# Patient Record
Sex: FEMALE | Race: WHITE | Employment: STUDENT | URBAN - METROPOLITAN AREA
[De-identification: names, ages, dates, MRNs, and addresses within clinical notes are randomized per-mention and may not be internally consistent; named-entity substitution may affect disease eponyms.]

---

## 2017-02-28 ENCOUNTER — OFFICE VISIT (OUTPATIENT)
Dept: PSYCHIATRY | Facility: CLINIC | Age: 22
End: 2017-02-28
Payer: MEDICAID

## 2017-02-28 VITALS
HEIGHT: 69 IN | DIASTOLIC BLOOD PRESSURE: 58 MMHG | TEMPERATURE: 97.6 F | SYSTOLIC BLOOD PRESSURE: 104 MMHG | HEART RATE: 79 BPM | WEIGHT: 145.1 LBS | BODY MASS INDEX: 21.49 KG/M2 | OXYGEN SATURATION: 98 %

## 2017-02-28 DIAGNOSIS — F41.1 GAD (GENERALIZED ANXIETY DISORDER): ICD-10-CM

## 2017-02-28 PROCEDURE — 99214 OFFICE O/P EST MOD 30 MIN: CPT | Performed by: NURSE PRACTITIONER

## 2017-02-28 RX ORDER — VENLAFAXINE HYDROCHLORIDE 37.5 MG/1
CAPSULE, EXTENDED RELEASE ORAL
Qty: 60 CAPSULE | Refills: 3 | Status: SHIPPED | OUTPATIENT
Start: 2017-02-28 | End: 2017-08-10

## 2017-02-28 ASSESSMENT — ANXIETY QUESTIONNAIRES
3. WORRYING TOO MUCH ABOUT DIFFERENT THINGS: NOT AT ALL
7. FEELING AFRAID AS IF SOMETHING AWFUL MIGHT HAPPEN: NOT AT ALL
GAD7 TOTAL SCORE: 3
5. BEING SO RESTLESS THAT IT IS HARD TO SIT STILL: NOT AT ALL
2. NOT BEING ABLE TO STOP OR CONTROL WORRYING: NOT AT ALL
6. BECOMING EASILY ANNOYED OR IRRITABLE: SEVERAL DAYS
1. FEELING NERVOUS, ANXIOUS, OR ON EDGE: SEVERAL DAYS
IF YOU CHECKED OFF ANY PROBLEMS ON THIS QUESTIONNAIRE, HOW DIFFICULT HAVE THESE PROBLEMS MADE IT FOR YOU TO DO YOUR WORK, TAKE CARE OF THINGS AT HOME, OR GET ALONG WITH OTHER PEOPLE: SOMEWHAT DIFFICULT

## 2017-02-28 ASSESSMENT — PATIENT HEALTH QUESTIONNAIRE - PHQ9: 5. POOR APPETITE OR OVEREATING: SEVERAL DAYS

## 2017-02-28 NOTE — Clinical Note
Hi Dr. Perez, Psychiatry update. I recommend therapy to help more with her symptoms. Will do about one more visit and will likely return care back to you if she continues to do well. Yamilet

## 2017-02-28 NOTE — NURSING NOTE
"Chief Complaint   Patient presents with     RECHECK       Initial /58 (BP Location: Right arm, Patient Position: Chair, Cuff Size: Adult Regular)  Pulse 79  Temp 97.6  F (36.4  C) (Oral)  Ht 5' 8.5\" (1.74 m)  Wt 145 lb 1.6 oz (65.8 kg)  LMP 02/14/2017 (Approximate)  SpO2 98%  BMI 21.74 kg/m2 Estimated body mass index is 21.74 kg/(m^2) as calculated from the following:    Height as of this encounter: 5' 8.5\" (1.74 m).    Weight as of this encounter: 145 lb 1.6 oz (65.8 kg).  Medication Reconciliation: complete   Flavia Mazariegos MA    "

## 2017-02-28 NOTE — PATIENT INSTRUCTIONS
Treatment Plan:    Cotninue Effexor (venlafaxine) XR 37.5 mg by mouth daily for 2 weeks then increase to 75 mg by mouth daily as tolerated. Take one capsule in AM and one capsule in PM.     Continue all other medications as reviewed per electronic medical record today.     All questions addressed. Education and counseling completed regarding risks and benefits of medications and psychotherapy options.    Safety plan was reviewed. To the Emergency Department as needed or call after hours crisis line at [phone] or [phone].     To schedule individual or family therapy, call [facility] at [phone].    Schedule an appointment with me in 3-6 months or sooner as needed. Call [facility] at [phone] to schedule.    Follow up with primary care provider as planned or for acute medical concerns.    Call the psychiatric nurse line with medication questions or concerns at [phone].    My Practice Policy was reviewed and signed: YES     MyChart may be used to communicate with your provider, but this is not intended to be used for emergencies.

## 2017-02-28 NOTE — MR AVS SNAPSHOT
After Visit Summary   2/28/2017    Cathleen Marie    MRN: [id number]           Patient Information     Date Of Birth          1995        Visit Information        Provider Department      2/28/2017 10:15 AM Yamilet Art NP [facility]        Today's Diagnoses     CRISTIANO (generalized anxiety disorder)          Care Instructions      Treatment Plan:    Cotninue Effexor (venlafaxine) XR 37.5 mg by mouth daily for 2 weeks then increase to 75 mg by mouth daily as tolerated. Take one capsule in AM and one capsule in PM.     Continue all other medications as reviewed per electronic medical record today.     All questions addressed. Education and counseling completed regarding risks and benefits of medications and psychotherapy options.    Safety plan was reviewed. To the Emergency Department as needed or call after hours crisis line at [phone] or [phone].     To schedule individual or family therapy, call [facility] at [phone].    Schedule an appointment with me in 3-6 months or sooner as needed. Call [facility] at [phone] to schedule.    Follow up with primary care provider as planned or for acute medical concerns.    Call the psychiatric nurse line with medication questions or concerns at [phone].    My Practice Policy was reviewed and signed: YES     MyChart may be used to communicate with your provider, but this is not intended to be used for emergencies.        Follow-ups after your visit        Follow-up notes from your care team     Return in about 4 months (around 6/28/2017).      Who to contact     If you have questions or need follow up information about today's clinic visit or your schedule please contact [facility] directly at [phone].  Normal or non-critical lab and imaging results will be communicated to you by MyChart, letter or phone within 4 business days after the clinic has received the  "results. If you do not hear from us within 7 days, please contact the clinic through [facility] or phone. If you have a critical or abnormal lab result, we will notify you by phone as soon as possible.  Submit refill requests through [employer] or call your pharmacy and they will forward the refill request to us. Please allow 3 business days for your refill to be completed.          Additional Information About Your Visit        dELiAs Information     [employer] lets you send messages to your doctor, view your test results, renew your prescriptions, schedule appointments and more. To sign up, go to [email or web address] . Click on \"Log in\" on the left side of the screen, which will take you to the Welcome page. Then click on \"Sign up Now\" on the right side of the page.     You will be asked to enter the access code listed below, as well as some personal information. Please follow the directions to create your username and password.     Your access code is: [email or web address]  Expires: 3/7/2017  2:03 PM     Your access code will  in 90 days. If you need help or a new code, please call your [facility] clinic or [phone].        Care EveryWhere ID     This is your Care EveryWhere ID. This could be used by other organizations to access your [facility] medical records  [id number]        Your Vitals Were     Pulse Temperature Height Last Period Pulse Oximetry BMI (Body Mass Index)    79 97.6  F (36.4  C) (Oral) 5' 8.5\" (1.74 m) 2017 (Approximate) 98% 21.74 kg/m2       Blood Pressure from Last 3 Encounters:   17 104/58   16 120/50   16 112/72    Weight from Last 3 Encounters:   17 145 lb 1.6 oz (65.8 kg)   16 142 lb (64.4 kg)   16 139 lb (63 kg)              Today, you had the following     No orders found for display         Where to get your medicines      These medications were sent to [facility] [id number] [place], [place] - [address] [address]  [address] " [address]     Phone:  [phone]     venlafaxine 37.5 MG 24 hr capsule          Primary Care Provider Office Phone # Fax #    Flaca Perez [phone]       [address] [address]        Thank you!     Thank you for choosing [facility]  for your care. Our goal is always to provide you with excellent care. Hearing back from our patients is one way we can continue to improve our services. Please take a few minutes to complete the written survey that you may receive in the mail after your visit with us. Thank you!             Your Updated Medication List - Protect others around you: Learn how to safely use, store and throw away your medicines at www.disposemymeds.org.          This list is accurate as of: 2/28/17 10:40 AM.  Always use your most recent med list.                   Brand Name Dispense Instructions for use    ibuprofen 200 MG tablet    ADVIL/MOTRIN     Take 200 mg by mouth every 4 hours as needed for pain Reported on 2/28/2017       NUVARING 0.12-0.015 MG/24HR vaginal ring   Generic drug:  etonogestrel-ethinyl estradiol     3 each    PLACE 1 RING VAGINALY EVERY 28 DAYS       venlafaxine 37.5 MG 24 hr capsule    EFFEXOR-XR    60 capsule    Start one capsule by mouth daily for 2 weeks then increase to two capsules daily.

## 2017-02-28 NOTE — PROGRESS NOTES
"    Outpatient Psychiatric Progress Note    Name: Cathleen Marie   : 1995                    Primary Care Provider: Flaca Perez MD - last visit 3/7/2016  Therapist: None    PHQ-9 scores:  PHQ-9 SCORE 3/7/2016 2016 2017   Total Score 9 15 6       CIRSTIANO-7 scores:  CRISTIANO-7 SCORE 2014   Total Score 7 12 3       Patient Identification:  Patient is a 22 year old year old, single  White American female  who presents for return visit with me.  Patient is currently employed part time. Patient attended the session alone. Patient prefers to be called: \"Cathleen\"    Interim History:    I last saw Cathleen Marie for outpatient psychiatry Consultation on 2016.     During that appointment, we Discontinued Prozac (fluoxetine).     Restart melatonin at bedtime for insomnia.     Consider addition of Vitamin D3 1000 - 2000 units per day.     Start Effexor (venlafaxine) XR 37.5 mg by mouth daily for 2 weeks then increase to 75 mg by mouth daily.      Current medications include:   Current Outpatient Prescriptions   Medication Sig     venlafaxine (EFFEXOR-XR) 37.5 MG 24 hr capsule Start one capsule by mouth daily for 2 weeks then increase to two capsules daily.     NUVARING 0.12-0.015 MG/24HR vaginal ring PLACE 1 RING VAGINALY EVERY 28 DAYS     ibuprofen (ADVIL,MOTRIN) 200 MG tablet Take 200 mg by mouth every 4 hours as needed for pain Reported on 2017     No current facility-administered medications for this visit.        The [place] Prescription Monitoring Program has been reviewed and there are no concerns about diversionary activity for controlled substances at this time.    Due to lack of insurance, she has only been taking Effexor (venlafaxine) XR 37.5 mg daily in the AM. Has not re-started melatonin for sleep.   Cathleen Marie reports mood has been: \"better\"  Anxiety has been: \"good, less shaking\" no panic  Sleep has been: \"good\" able to fall asleep. No nightmares. " "  PHQ9 and GAD7 scores were reviewed today.   Medication side effects: stomachaches. Almost every day. Usually worse at end of day.   Current stressors include: Symptoms, Occupational difficulties, Phase of Life Difficulties  Coping mechanisms and supports include: Take a bath, Sleeping, Yoga, Marijuana     Past Medical History   Diagnosis Date     Meningitis       has a past medical history of Meningitis.    Social History:  Current Living situation:  [place], [place] with Father. Feels safe at home.  Employment: unemployed - found a part time job as a   Current use of drugs or alcohol: Alcohol once per week- 2 glasses of wine. Cannabis Daily since last year. Reports Marijuana helps with sleep.   Tobacco use: Yes- 1 pp week for 2 years. Yes interest in quitting. NRT tried: [place] but environment makes this tough  Caffeine: Yes 3 cups/day of coffee  Recovery Programming Involvement: None    Vital Signs:   /58 (BP Location: Right arm, Patient Position: Chair, Cuff Size: Adult Regular)  Pulse 79  Temp 97.6  F (36.4  C) (Oral)  Ht 5' 8.5\" (1.74 m)  Wt 145 lb 1.6 oz (65.8 kg)  LMP 02/14/2017 (Approximate)  SpO2 98%  BMI 21.74 kg/m2    Labs:  Most recent laboratory results reviewed and no new labs. Denies she could be pregnant.     Review of Systems:  10 systems (general, cardiovascular, respiratory, eyes, ENT, endocrine, GI, , M/S, neurological) were reviewed. Most pertinent finding(s) is/are: stomachaches, headaches. The remaining systems are all unremarkable.    Mental Status Examination:  Appearance: awake, alert, adequately groomed, appeared stated age, no apparent distress, normal weight and wears makeup  Attitude: cooperative   Eye Contact: good  Gait and Station: Normal, No assistive Devices used and No dizziness or falls  Psychomotor Behavior: no evidence of tardive dyskinesia, dystonia, or tics  Oriented to: time, person, and place  Attention Span and Concentration: adequate  Speech: " "clear, coherent, regular rate, regular rhythm and fluent  Language: Able to read and write  Mood: anxious and depressed, but \"better\"  Affect: mood congruent, calmer, brighter  Associations: no loose associations  Thought Process: logical, linear and goal oriented  Thought Content: no evidence of suicidal ideation or homicidal ideation, no evidence of psychotic thought, no auditory hallucinations present, no visual hallucinations present and Appropriate to Interview  Recent and Remote Memory: intact Not formally assessed. Forgetful. Does not remember childhood. Possibly related to seizure disorder and medications. No amnesia.   Fund of Knowledge: appropriate  Insight: fair  Judgment: intact  Impulse Control: intact    Suicide Risk Assessment:  Today Cathleen Marie reports psychosocial stressors, which have been less lately. In addition, there are notable risk factors for self-harm, including age, anxiety and family history However, risk is mitigated by commitment to family, absence of past attempts, ability to volunteer a safety plan, history of seeking help when needed, future oriented, no access to firearms or weapons, denies suicidal intent or plan and denies homicidal ideation, intent, or plan. Therefore, based on all available evidence including the factors cited above, Cathleen Marie does not appear to be at imminent risk for self-harm, does not meet criteria for a 72-hr hold, and therefore remains appropriate for ongoing outpatient level of care. A thorough assessment of risk factors related to suicide and self-harm have been reviewed and are noted above. The patient convincingly denies suicidality on several occasions. There was no deceit detected, and the patient presented in a manner that was believable.     DSM5 Diagnosis:  296.32 Major Depressive Disorder, Recurrent Episode, Moderate to Severe, without psychosis, With anxious distress  300.23 (F40.10) Social Anxiety Disorder  300.02 (F41.1) Generalized " Anxiety Disorder  Cannabis Use Disorder - Criteria met includes: current daily use;  moderate    Rule out Borderline Personality Disorder (301.83)      Medical comorbidities include:   Patient Active Problem List    Diagnosis Date Noted     HSV (herpes simplex virus) infection 09/18/2014     Priority: Medium     Vaginal and mouth - dx by culture 4/2014  Has valtrex at home for outbreak       Major depressive disorder, recurrent episode, moderate (H) 09/18/2014     Priority: Medium     CRISTIANO (generalized anxiety disorder) 05/04/2012     Priority: Medium     Chronic constipation 03/21/2011     Priority: Medium       Psychosocial & Contextual Factors:  Occupational Difficulties, Financial Difficulties, Relationship Difficulties and Phase of Life Difficulties    Assessment:  Cathleen Marie reports improvement in mood and anxiety and sleep with new medication. Is struggling with nausea, but likely not from medication. Will continue to monitor. Medication side effects and alternatives were reviewed. Consider GeneSight Testing. Reviewed importance of therapy.     Treatment Plan:    Continue Effexor (venlafaxine) XR 37.5 mg by mouth daily for 2 weeks then increase to 75 mg by mouth daily as tolerated. Take one capsule in AM and one capsule in PM.     Continue all other medications as reviewed per electronic medical record today.     All questions addressed. Education and counseling completed regarding risks and benefits of medications and psychotherapy options.    Safety plan was reviewed. To the Emergency Department as needed or call after hours crisis line at [phone] or [phone].     To schedule individual or family therapy, call [facility] at [phone].    Schedule an appointment with me in 3-6 months or sooner as needed. Call [facility] at [phone] to schedule.    Follow up with primary care provider as planned or for acute medical concerns.    Call the psychiatric nurse  line with medication questions or concerns at [phone].    My Practice Policy was reviewed and signed: YES     MyChart may be used to communicate with your provider, but this is not intended to be used for emergencies.    Patient Education:  Marijuana makes you feel great-until it doesn't. Short term positive effects are euphoria and enhanced perception. Not always so positive are: time distortion, hallucinations, anxiety (especially in patients with anxiety disorders), tachycardia, sleepiness, and impaired memory and problem solving.      Marijuana has negative long term effects on chronic users, especially those who started in their teens. These include amotivational syndrome, less success than peers in school and career pursuits, and possibly a reduction in IQ points.     Marijuana and psychosis are clearly associated. Smoking heavily can uncover a psychotic disorder earlier than it would have emerged otherwise. Whether it actually causes psychosis is more controversial.     While high-driving is usually less hazardous than drunk-driving, Marijuana interferes with judgment and perceptions, leading to potential fatalities as well as arrests for DUIs. The highest risk period is during the hour immediately after getting high. Do not smoke and drive.     Withdrawal from regular Marijuana use does occur. A typical Marijuana detox in a heavy user will last 3 to 5 days and may include irritability, insomnia, anxiety, headache, nausea and vomiting, and sometimes diarrhea. Patients should drink plenty of fluids and plan to be out of commission for a while.     We have no medications for treating marijuana use disorder. Dronabinol (Marinol) is a synthetic form of THC that is FDA approved for intractable nausea and AIDS wasting syndrome, but clinical trials have not shown it to be effective for substitution treatment of Marijuana abuse.     Administrative Billing:   Time spent with patient was 30 minutes and greater  than 50% of time or 20 minutes was spent in counseling and coordination of care.    Signed:   Yamilet Art, PhD, APRN, CNP   Psychiatry

## 2017-03-01 ASSESSMENT — ANXIETY QUESTIONNAIRES: GAD7 TOTAL SCORE: 3

## 2017-03-01 ASSESSMENT — PATIENT HEALTH QUESTIONNAIRE - PHQ9: SUM OF ALL RESPONSES TO PHQ QUESTIONS 1-9: 6

## 2017-08-05 ENCOUNTER — HEALTH MAINTENANCE LETTER (OUTPATIENT)
Age: 22
End: 2017-08-05

## 2017-08-10 ENCOUNTER — TELEPHONE (OUTPATIENT)
Dept: PSYCHIATRY | Facility: CLINIC | Age: 22
End: 2017-08-10

## 2017-08-10 DIAGNOSIS — F41.1 GAD (GENERALIZED ANXIETY DISORDER): ICD-10-CM

## 2017-08-10 RX ORDER — VENLAFAXINE HYDROCHLORIDE 37.5 MG/1
37.5 CAPSULE, EXTENDED RELEASE ORAL 2 TIMES DAILY
Qty: 60 CAPSULE | Refills: 0 | Status: SHIPPED | OUTPATIENT
Start: 2017-08-10 | End: 2017-08-14

## 2017-08-10 NOTE — TELEPHONE ENCOUNTER
Spoke to Pt, She initially had not increased her Effexor to BID, however in the past month has done so.  When she attempted to fill the medication, she was told she needed to call our office to get this filled. Reviewed chart and sent new Rx to her pharmacy.     Encouraged her to schedule return visit.     Last OV with Yamilet Art, PhD, APRN, CNP 2/28/17  Assessment:  Cathleen Marie reports improvement in mood and anxiety and sleep with new medication. Is struggling with nausea, but likely not from medication. Will continue to monitor. Medication side effects and alternatives were reviewed. Consider GeneSight Testing. Reviewed importance of therapy.      Treatment Plan:    Continue Effexor (venlafaxine) XR 37.5 mg by mouth daily for 2 weeks then increase to 75 mg by mouth daily as tolerated. Take one capsule in AM and one capsule in PM.     Continue all other medications as reviewed per electronic medical record today.     All questions addressed. Education and counseling completed regarding risks and benefits of medications and psychotherapy options.    Safety plan was reviewed. To the Emergency Department as needed or call after hours crisis line at [phone] or [phone].     To schedule individual or family therapy, call [facility] at [phone].    Schedule an appointment with me in 3-6 months or sooner as needed. Call [facility] at [phone] to schedule.    Follow up with primary care provider as planned or for acute medical concerns.    Call the psychiatric nurse line with medication questions or concerns at [phone].    My Practice Policy was reviewed and signed: YES     MyChart may be used to communicate with your provider, but this is not intended to be used for emergencies.     Patient Education:  Marijuana makes you feel great-until it doesn't. Short term positive effects are euphoria and enhanced perception. Not always so positive are: time  distortion, hallucinations, anxiety (especially in patients with anxiety disorders), tachycardia, sleepiness, and impaired memory and problem solving.       Marijuana has negative long term effects on chronic users, especially those who started in their teens. These include amotivational syndrome, less success than peers in school and career pursuits, and possibly a reduction in IQ points.      Marijuana and psychosis are clearly associated. Smoking heavily can uncover a psychotic disorder earlier than it would have emerged otherwise. Whether it actually causes psychosis is more controversial.      While high-driving is usually less hazardous than drunk-driving, Marijuana interferes with judgment and perceptions, leading to potential fatalities as well as arrests for DUIs. The highest risk period is during the hour immediately after getting high. Do not smoke and drive.      Withdrawal from regular Marijuana use does occur. A typical Marijuana detox in a heavy user will last 3 to 5 days and may include irritability, insomnia, anxiety, headache, nausea and vomiting, and sometimes diarrhea. Patients should drink plenty of fluids and plan to be out of commission for a while.      We have no medications for treating marijuana use disorder. Dronabinol (Marinol) is a synthetic form of THC that is FDA approved for intractable nausea and AIDS wasting syndrome, but clinical trials have not shown it to be effective for substitution treatment of Marijuana abuse.      Administrative Billing:   Time spent with patient was 30 minutes and greater than 50% of time or 20 minutes was spent in counseling and coordination of care.     Signed:   Yamilet Art, PhD, APRN, CNP   Psychiatry

## 2017-08-10 NOTE — TELEPHONE ENCOUNTER
Reason for call:  Other   Patient called regarding (reason for call): prescription  Additional comments: Patient switched pharmacies and the new pharmacy is telling her that she should only be taking one pill (Effexor) a day, but she said Yamilet told her to take two a day.  She has 3 refills left, but she would like a call back to clarify the correct amount.        Phone number to reach patient:  Home number on file [phone] (home)    Best Time:  any    Can we leave a detailed message on this number?  YES

## 2017-08-14 RX ORDER — VENLAFAXINE HYDROCHLORIDE 75 MG/1
75 CAPSULE, EXTENDED RELEASE ORAL DAILY
Qty: 30 CAPSULE | Refills: 0 | Status: SHIPPED | OUTPATIENT
Start: 2017-08-14 | End: 2017-10-02

## 2017-08-14 NOTE — TELEPHONE ENCOUNTER
PA for QTY 60 per month of Effexor (Venlafaxine) XR 37.5 mg was denied. They want patient to take one capsule per day of Effexor (Venlafaxine) XR 75 mg. Will discuss with provider, as it was ordered for BID dosing.

## 2017-08-14 NOTE — TELEPHONE ENCOUNTER
I have not seen since February.   I dont see a reason why she needs two 37.5 mg tablets as of now.   Please contact patient and let her know she needs a follow up with me soon- if not already scheduled.   I am okay with sending in 75 mg capsules.

## 2017-10-02 ENCOUNTER — OFFICE VISIT (OUTPATIENT)
Dept: PSYCHIATRY | Facility: CLINIC | Age: 22
End: 2017-10-02
Payer: COMMERCIAL

## 2017-10-02 VITALS
BODY MASS INDEX: 20.83 KG/M2 | WEIGHT: 140.6 LBS | SYSTOLIC BLOOD PRESSURE: 104 MMHG | TEMPERATURE: 99.6 F | HEART RATE: 89 BPM | OXYGEN SATURATION: 98 % | DIASTOLIC BLOOD PRESSURE: 60 MMHG | HEIGHT: 69 IN

## 2017-10-02 DIAGNOSIS — F41.1 GAD (GENERALIZED ANXIETY DISORDER): Primary | ICD-10-CM

## 2017-10-02 DIAGNOSIS — F33.1 MAJOR DEPRESSIVE DISORDER, RECURRENT EPISODE, MODERATE (H): ICD-10-CM

## 2017-10-02 DIAGNOSIS — F40.10 SOCIAL ANXIETY DISORDER: ICD-10-CM

## 2017-10-02 PROBLEM — F12.20 CANNABIS USE DISORDER, MODERATE, DEPENDENCE (H): Status: ACTIVE | Noted: 2017-10-02

## 2017-10-02 PROCEDURE — 99214 OFFICE O/P EST MOD 30 MIN: CPT | Performed by: NURSE PRACTITIONER

## 2017-10-02 RX ORDER — VENLAFAXINE HYDROCHLORIDE 75 MG/1
75 CAPSULE, EXTENDED RELEASE ORAL DAILY
Qty: 90 CAPSULE | Refills: 1 | Status: SHIPPED | OUTPATIENT
Start: 2017-10-02

## 2017-10-02 RX ORDER — CHOLECALCIFEROL (VITAMIN D3) 25 MCG
CAPSULE ORAL
COMMUNITY

## 2017-10-02 ASSESSMENT — PATIENT HEALTH QUESTIONNAIRE - PHQ9
5. POOR APPETITE OR OVEREATING: SEVERAL DAYS
SUM OF ALL RESPONSES TO PHQ QUESTIONS 1-9: 6

## 2017-10-02 ASSESSMENT — ANXIETY QUESTIONNAIRES
7. FEELING AFRAID AS IF SOMETHING AWFUL MIGHT HAPPEN: SEVERAL DAYS
5. BEING SO RESTLESS THAT IT IS HARD TO SIT STILL: NOT AT ALL
3. WORRYING TOO MUCH ABOUT DIFFERENT THINGS: SEVERAL DAYS
6. BECOMING EASILY ANNOYED OR IRRITABLE: MORE THAN HALF THE DAYS
2. NOT BEING ABLE TO STOP OR CONTROL WORRYING: SEVERAL DAYS
GAD7 TOTAL SCORE: 8
1. FEELING NERVOUS, ANXIOUS, OR ON EDGE: MORE THAN HALF THE DAYS

## 2017-10-02 NOTE — NURSING NOTE
"Chief Complaint   Patient presents with     Consult       Initial /60 (BP Location: Left arm, Patient Position: Sitting, Cuff Size: Adult Large)  Pulse 89  Temp 99.6  F (37.6  C) (Oral)  Ht 5' 8.5\" (1.74 m)  Wt 140 lb 9.6 oz (63.8 kg)  LMP 09/19/2017 (Approximate)  SpO2 98%  Breastfeeding? No  BMI 21.07 kg/m2 Estimated body mass index is 21.07 kg/(m^2) as calculated from the following:    Height as of this encounter: 5' 8.5\" (1.74 m).    Weight as of this encounter: 140 lb 9.6 oz (63.8 kg).  Medication Reconciliation: complete   Krystyna HUERTA      "

## 2017-10-02 NOTE — MR AVS SNAPSHOT
After Visit Summary   10/2/2017    Cathleen Marie    MRN: [id number]           Patient Information     Date Of Birth          1995        Visit Information        Provider Department      10/2/2017 2:45 PM Yamilet Art NP [facility]        Today's Diagnoses     CRISTIANO (generalized anxiety disorder)    -  1    Major depressive disorder, recurrent episode, moderate (H)        Social anxiety disorder          Care Instructions    Treatment Plan:    Continue Effexor (venlafaxine) XR 75 mg by mouth daily for mood and anxiety. May consider dose increase if needed.     Continue all other medications as reviewed per electronic medical record today.     Safety plan was reviewed. To the Emergency Department as needed or call after hours crisis line at [phone] or [phone].     To schedule individual or family therapy, call [facility] at [phone].  I can also refer to a community therapist if needed. [email or web address]     Schedule an appointment with me as needed.   Thank you for our work together in the Psychiatry Collaborative Care Model at [facility]. This is our last visit and I am returning your care back to your Primary Care Provider. If you are not doing well, please contact your Primary Care Provider office.     Follow up with primary care provider as planned or for acute medical concerns.    My Practice Policy was reviewed and signed: YES     MyChart may be used to communicate with your provider, but this is not intended to be used for emergencies.          Follow-ups after your visit        Follow-up notes from your care team     Return if symptoms worsen or fail to improve.      Who to contact     If you have questions or need follow up information about today's clinic visit or your schedule please contact [facility] directly at [phone].  Normal or non-critical lab and imaging results will be  "communicated to you by [employer]osof[employer], letter or phone within 4 business days after the clinic has received the results. If you do not hear from us within 7 days, please contact the clinic through [employer] or phone. If you have a critical or abnormal lab result, we will notify you by phone as soon as possible.  Submit refill requests through [employer] or call your pharmacy and they will forward the refill request to us. Please allow 3 business days for your refill to be completed.          Additional Information About Your Visit        [employer] Information     [employer] lets you send messages to your doctor, view your test results, renew your prescriptions, schedule appointments and more. To sign up, go to [email or web address] . Click on \"Log in\" on the left side of the screen, which will take you to the Welcome page. Then click on \"Sign up Now\" on the right side of the page.     You will be asked to enter the access code listed below, as well as some personal information. Please follow the directions to create your username and password.     Your access code is: -  Expires: 2017  3:26 PM     Your access code will  in 90 days. If you need help or a new code, please call your [place] clinic or [phone].        Care EveryWhere ID     This is your Care EveryWhere ID. This could be used by other organizations to access your [place] medical records  [id number]        Your Vitals Were     Pulse Temperature Height Last Period Pulse Oximetry Breastfeeding?    89 99.6  F (37.6  C) (Oral) 5' 8.5\" (1.74 m) 2017 (Approximate) 98% No    BMI (Body Mass Index)                   21.07 kg/m2            Blood Pressure from Last 3 Encounters:   10/02/17 104/60   17 104/58   16 120/50    Weight from Last 3 Encounters:   10/02/17 140 lb 9.6 oz (63.8 kg)   17 145 lb 1.6 oz (65.8 kg)   16 142 lb (64.4 kg)              Today, you had the following     No orders found for display       "   Where to get your medicines      These medications were sent to [id number] - [place], [place] - [address] AT [facility] [address]  [address], [facility] [address]    Hours:  24-hours Phone:  [phone]     venlafaxine 75 MG 24 hr capsule          Primary Care Provider Office Phone # Fax #    Flaca Kitty Perez [phone]       [facility] [address]        Equal Access to Services     VANESSA SAN : Hadii aad ku hadasho Soomaali, waaxda luqadaha, qaybta kaalmada adeegyada, waxay idiin hayaan adeeg kharash labelinda . So [facility] [phone].    ATENCIÓN: Si habla español, tiene a pichardo disposición servicios gratuitos de asistencia lingüística. [facility] [phone].    We comply with applicable federal civil rights laws and [place] laws. We do not discriminate on the basis of race, color, national origin, age, disability, sex, sexual orientation, or gender identity.            Thank you!     Thank you for choosing [facility]  for your care. Our goal is always to provide you with excellent care. Hearing back from our patients is one way we can continue to improve our services. Please take a few minutes to complete the written survey that you may receive in the mail after your visit with us. Thank you!             Your Updated Medication List - Protect others around you: Learn how to safely use, store and throw away your medicines at www.disposemymeds.org.          This list is accurate as of: 10/2/17  3:26 PM.  Always use your most recent med list.                   Brand Name Dispense Instructions for use Diagnosis    ibuprofen 200 MG tablet    ADVIL/MOTRIN     Take 200 mg by mouth every 4 hours as needed for pain Reported on 2/28/2017        NUVARING 0.12-0.015 MG/24HR vaginal ring   Generic drug:  etonogestrel-ethinyl estradiol     3 each    PLACE 1 RING VAGINALY EVERY 28 DAYS    Contraception       venlafaxine 75 MG 24 hr capsule    EFFEXOR-XR     90 capsule    Take 1 capsule (75 mg) by mouth daily    CRISTIANO (generalized anxiety disorder)       Vitamin D-3 1000 UNITS Caps

## 2017-10-02 NOTE — PROGRESS NOTES
"    Outpatient Psychiatric Progress Note    Name: Cathleen Marie   : 1995                    Primary Care Provider: Flaca Perez MD - last visit 3/7/2016  Therapist: None    PHQ-9 scores:  PHQ-9 SCORE 2016 2017 10/2/2017   Total Score 15 6 6       CRISTIANO-7 scores:  CRISTIANO-7 SCORE 2016 2017 10/2/2017   Total Score 12 3 8       Patient Identification:  Patient is a 22 year old year old, partnered  White American female  who presents for return visit with me.  Patient is currently employed part time. Patient attended the session alone. Patient prefers to be called: \"Cathleen\".    Interim History:  I last saw Cathleen Marie for outpatient psychiatry Return Visit on 2017.   During that appointment, we Continue Effexor (venlafaxine) XR 37.5 mg by mouth daily for 2 weeks then increase to 75 mg by mouth daily as tolerated. Take one capsule in AM and one capsule in PM.    Current medications include:   Current Outpatient Prescriptions   Medication Sig     Cholecalciferol (VITAMIN D-3) 1000 UNITS CAPS      venlafaxine (EFFEXOR-XR) 75 MG 24 hr capsule Take 1 capsule (75 mg) by mouth daily     NUVARING 0.12-0.015 MG/24HR vaginal ring PLACE 1 RING VAGINALY EVERY 28 DAYS     ibuprofen (ADVIL,MOTRIN) 200 MG tablet Take 200 mg by mouth every 4 hours as needed for pain Reported on 2017     No current facility-administered medications for this visit.        The [place] Prescription Monitoring Program has been reviewed and there are no concerns about diversionary activity for controlled substances at this time.      I was able to review most recent Primary Care Provider, specialty provider, and therapy visit notes that I have access to.     Cathleen Marie reports mood has been: \"good\" irritability at times. No hypomania or may. Low energy.  Anxiety has been: \"ongong worries at times\" panic last month when moving.   Sleep has been: \"sometimes hard to stay asleep\" no nightmares. Rare naps. " "  PHQ9 and GAD7 scores were reviewed today.   Misses medication dosing infrequently and does feel withdrawal effects.  Medication side effects: Denies  Current stressors include: Symptoms, Occupational difficulties- new job, Phase of Life Difficulties, Recent Move last month, Financial Difficulties, Grief/Loss of Uncle last week  Coping mechanisms and supports include: Take a bath, Sleeping, Yoga, Marijuana, Music, Boyfriend    Past medication trials include but are not limited to:   Zoloft (sertraline) helped some, but stopped working  Celexa (citalopram) did not work  Inderal (propranolol)   Prozac (fluoxetine) helped some at first, but stopped working  Melatonin  Effexor (venlafaxine)     Past Medical History:   Diagnosis Date     Meningitis       has a past medical history of Meningitis.    Social History:  Current Living situation: [place], [place] with Boyfriend. Feels safe at home.  Current use of drugs or alcohol: Alcohol once per week- 2 glasses of wine. Cannabis Daily since last 2016. Reports Marijuana helps with sleep.   Tobacco use: Yes- less lately. 1 pp week for 2 years. Yes interest in quitting. NRT tried: [place] but environment makes this tough- boyfriend smokes  Caffeine: Yes 3 cups/day of coffee  Recovery Programming Involvement: None    Vital Signs:   /60 (BP Location: Left arm, Patient Position: Sitting, Cuff Size: Adult Large)  Pulse 89  Temp 99.6  F (37.6  C) (Oral)  Ht 5' 8.5\" (1.74 m)  Wt 140 lb 9.6 oz (63.8 kg)  LMP 09/19/2017 (Approximate)  SpO2 98%  Breastfeeding? No  BMI 21.07 kg/m2    Labs:  Most recent laboratory results reviewed and no new labs.     Review of Systems:  10 systems (general, cardiovascular, respiratory, eyes, ENT, endocrine, GI, , M/S, neurological) were reviewed. Most pertinent finding(s) is/are: dizziness- orthostasis. The remaining systems are all unremarkable.    Mental Status Examination:  Appearance: awake, alert, adequately groomed, appeared " "stated age, no apparent distress, normal weight and wears makeup, right upper inner arm tattoo  Attitude: cooperative   Eye Contact: good  Gait and Station: Normal, No assistive Devices used and No dizziness or falls  Psychomotor Behavior: no evidence of tardive dyskinesia, dystonia, or tics  Oriented to: time, person, and place  Attention Span and Concentration: adequate  Speech: clear, coherent, regular rate, regular rhythm and fluent  Language: Able to read and write  Mood: \"good\"  Affect: mood congruent, calm, reactive as appropriate  Associations: no loose associations  Thought Process: logical, linear and goal oriented  Thought Content: no evidence of suicidal ideation or homicidal ideation, no evidence of psychotic thought, no auditory hallucinations present, no visual hallucinations present and Appropriate to Interview  Recent and Remote Memory: intact to interview. Not formally assessed. Forgetful. Does not remember childhood. Possibly related to seizure disorder and many medications. No amnesia.   Fund of Knowledge: appropriate  Insight: fair  Judgment: intact and adequate for safety  Impulse Control: intact     Suicide Risk Assessment:  Today Cathleen Marie reports psychosocial stressors, which have been less lately. In addition, there are notable risk factors for self-harm, including age, anxiety and family history However, risk is mitigated by commitment to family, absence of past attempts, ability to volunteer a safety plan, history of seeking help when needed, future oriented, no access to firearms or weapons, denies suicidal intent or plan and denies homicidal ideation, intent, or plan. Therefore, based on all available evidence including the factors cited above, Cathleen Marie does not appear to be at imminent risk for self-harm, does not meet criteria for a 72-hr hold, and therefore remains appropriate for ongoing outpatient level of care. A thorough assessment of risk factors related to suicide " and self-harm have been reviewed and are noted above. Local community safety resources printed and reviewed for patient to use if needed. There was no deceit detected, and the patient presented in a manner that was believable.      DSM5 Diagnosis:  296.32 Major Depressive Disorder, Recurrent Episode, Moderate to Severe, without psychosis, With anxious distress  300.23 (F40.10) Social Anxiety Disorder  300.02 (F41.1) Generalized Anxiety Disorder  Cannabis Use Disorder - Criteria met includes: current daily use;  moderate                          Rule out Borderline Personality Disorder (301.83)    Medical comorbidities include:   Patient Active Problem List    Diagnosis Date Noted     Social anxiety disorder 10/02/2017     Priority: Medium     Cannabis use disorder, moderate, dependence (H) 10/02/2017     Priority: Medium     HSV (herpes simplex virus) infection 09/18/2014     Priority: Medium     Vaginal and mouth - dx by culture 4/2014  Has valtrex at home for outbreak       Major depressive disorder, recurrent episode, moderate (H) 09/18/2014     Priority: Medium     CRISTIANO (generalized anxiety disorder) 05/04/2012     Priority: Medium     Chronic constipation 03/21/2011     Priority: Medium       Psychosocial & Contextual Factors:  Occupational Difficulties, Financial Difficulties, Relationship Difficulties and Phase of Life Difficulties    Assessment:  Cathleen Marie reports stable mood and anxiety with the Effexor (venlafaxine). Limited side effects, particularly discontinuation symptoms. Discussed memory reminders for medications. Medication side effects and alternatives were reviewed. Health promotion activities recommended and reviewed today. All questions addressed. Education and counseling completed regarding risks and benefits of medications and psychotherapy options. Recommend therapy for additional support. Patient reports she is still working on finding a new Primary Care Provider. Reviewed Collaborative  [facility] Psychiatry Service model.  Discussed return to Primary Care Provider.     Treatment Plan:    Continue Effexor (venlafaxine) XR 75 mg by mouth daily for mood and anxiety. May consider dose increase if needed.     Continue all other medications as reviewed per electronic medical record today.     Safety plan was reviewed. To the Emergency Department as needed or call after hours crisis line at [phone] or [phone].     To schedule individual or family therapy, call [facility] at [phone]. I can also refer to a community therapist if needed.    Schedule an appointment with me as needed.   Thank you for our work together in the Psychiatry Collaborative Care Model at [facility]. This is our last visit and I am returning your care back to your Primary Care Provider. If you are not doing well, please contact your Primary Care Provider office.     Follow up with primary care provider as planned or for acute medical concerns.    My Practice Policy was reviewed and signed: YES     Cloud[employer]art may be used to communicate with your provider, but this is not intended to be used for emergencies.    Administrative Billing:   Time spent with patient was 30 minutes and greater than 50% of time or 20 minutes was spent in counseling and coordination of care.    Patient Status:  The patient is being returned to the referring provider for ongoing care and medication prescribing.  The patient can be referred back to this service for further consultation as needed.    Signed:   Yamilet Art, PhD, APRN, CNP   Psychiatry

## 2017-10-02 NOTE — PATIENT INSTRUCTIONS
Treatment Plan:    Continue Effexor (venlafaxine) XR 75 mg by mouth daily for mood and anxiety. May consider dose increase if needed.     Continue all other medications as reviewed per electronic medical record today.     Safety plan was reviewed. To the Emergency Department as needed or call after hours crisis line at [phone] or [phone].     To schedule individual or family therapy, call [facility] at [phone].  I can also refer to a community therapist if needed. [email or web address]     Schedule an appointment with me as needed.   Thank you for our work together in the Psychiatry Collaborative Care Model at [facility]. This is our last visit and I am returning your care back to your Primary Care Provider. If you are not doing well, please contact your Primary Care Provider office.     Follow up with primary care provider as planned or for acute medical concerns.    My Practice Policy was reviewed and signed: YES     MyChart may be used to communicate with your provider, but this is not intended to be used for emergencies.

## 2017-10-03 ASSESSMENT — ANXIETY QUESTIONNAIRES: GAD7 TOTAL SCORE: 8

## 2017-10-16 DIAGNOSIS — B00.9 HSV (HERPES SIMPLEX VIRUS) INFECTION: ICD-10-CM

## 2017-10-16 RX ORDER — ETONOGESTREL AND ETHINYL ESTRADIOL VAGINAL RING .015; .12 MG/D; MG/D
RING VAGINAL
Qty: 3 EACH | Refills: 3 | Status: CANCELLED | OUTPATIENT
Start: 2017-10-16

## 2017-10-16 RX ORDER — VALACYCLOVIR HYDROCHLORIDE 500 MG/1
500 TABLET, FILM COATED ORAL 2 TIMES DAILY
Qty: 12 TABLET | Refills: 3 | Status: CANCELLED | OUTPATIENT
Start: 2017-10-16

## 2017-10-16 NOTE — TELEPHONE ENCOUNTER
Refill request for Nuvaring and Valtrex    Nuvaring sent 7/29/2016 #3 with 3 refills (by ALVARO)  Valtrex not on active med list    These requests were sent to [facility] via fax 10/11/2017     Dr Flaca Perez listed as PCP - last saw her March 2016  Last seen here at [facility] by Dr Owens July 2016    Left message for patient to callback.  Who is patient's PCP?  Needs appt for refill  Mesha DESHPANDE RN

## 2018-03-25 DIAGNOSIS — F41.1 GAD (GENERALIZED ANXIETY DISORDER): ICD-10-CM

## 2018-03-27 RX ORDER — VENLAFAXINE HYDROCHLORIDE 75 MG/1
CAPSULE, EXTENDED RELEASE ORAL
Qty: 90 CAPSULE | Refills: 0 | OUTPATIENT
Start: 2018-03-27

## 2018-03-27 NOTE — TELEPHONE ENCOUNTER
Needs appointment - I haven't seen this patient since 2016.  Once schedules, okay to fill for 1 month.  Thank you,  Flaca Perez MD

## 2018-03-28 NOTE — TELEPHONE ENCOUNTER
Patient was called ; she is not going to [facility] at this time due to Flaca Perez MD moving from [facility].  She will send this request to her claudia.